# Patient Record
Sex: FEMALE | URBAN - METROPOLITAN AREA
[De-identification: names, ages, dates, MRNs, and addresses within clinical notes are randomized per-mention and may not be internally consistent; named-entity substitution may affect disease eponyms.]

---
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
## 2022-05-10 ENCOUNTER — OFFICE VISIT (OUTPATIENT)
Dept: URBAN - METROPOLITAN AREA CLINIC 71 | Facility: CLINIC | Age: 22
End: 2022-05-10
Payer: COMMERCIAL

## 2022-05-10 DIAGNOSIS — H52.13 MYOPIA, BILATERAL: Primary | ICD-10-CM

## 2022-05-10 PROCEDURE — 92014 COMPRE OPH EXAM EST PT 1/>: CPT | Performed by: OPTOMETRIST

## 2022-05-10 ASSESSMENT — INTRAOCULAR PRESSURE
OD: 19
OS: 15

## 2022-05-10 ASSESSMENT — VISUAL ACUITY
OS: 20/20
OD: 20/20

## 2022-05-10 NOTE — IMPRESSION/PLAN
Impression: Myopia, bilateral: H52.13. Plan: Myopia or nearsightedness develops during school age and is a result of the eyeball being too long, or the cornea having too much curvature. Patients usually complain of not being able to see or read distant objects, such as chalkboard writing, TV screen or movies. Myopia often progresses until patients reach their late twenties or early thirties. Myopia can be managed with corrective eye wear, such as eyeglasses or contacts to correct vision. New glasses RX given today for DVO. Discussed changes in the prescription compared to her old glasses.  
Will continue to monitor annually for vision exams